# Patient Record
Sex: MALE | Race: WHITE | URBAN - METROPOLITAN AREA
[De-identification: names, ages, dates, MRNs, and addresses within clinical notes are randomized per-mention and may not be internally consistent; named-entity substitution may affect disease eponyms.]

---

## 2017-01-01 ENCOUNTER — HOSPITAL ENCOUNTER (OUTPATIENT)
Facility: CLINIC | Age: 67
End: 2017-01-12
Attending: INTERNAL MEDICINE | Admitting: INTERNAL MEDICINE
Payer: COMMERCIAL

## 2017-01-01 ENCOUNTER — APPOINTMENT (OUTPATIENT)
Dept: CARDIOLOGY | Facility: CLINIC | Age: 67
End: 2017-01-01
Attending: INTERNAL MEDICINE
Payer: COMMERCIAL

## 2017-01-01 PROCEDURE — 33952 ECMO/ECLS INSJ PRPH CANNULA: CPT | Mod: 78 | Performed by: INTERNAL MEDICINE

## 2017-01-01 PROCEDURE — 33952 ECMO/ECLS INSJ PRPH CANNULA: CPT

## 2017-01-01 PROCEDURE — 27210946 ZZH KIT HC TOTES DISP CR8

## 2017-01-01 PROCEDURE — C1769 GUIDE WIRE: HCPCS

## 2017-01-01 PROCEDURE — 27211184 ZZH CARDIOHELP CIRCUIT

## 2017-01-01 PROCEDURE — 27211089 ZZH KIT ACIST INJECTOR CR3

## 2017-01-01 PROCEDURE — 82947 ASSAY GLUCOSE BLOOD QUANT: CPT | Performed by: INTERNAL MEDICINE

## 2017-01-01 PROCEDURE — 84132 ASSAY OF SERUM POTASSIUM: CPT | Performed by: INTERNAL MEDICINE

## 2017-01-01 PROCEDURE — 82330 ASSAY OF CALCIUM: CPT | Performed by: INTERNAL MEDICINE

## 2017-01-01 PROCEDURE — 82803 BLOOD GASES ANY COMBINATION: CPT | Performed by: INTERNAL MEDICINE

## 2017-01-01 PROCEDURE — 84295 ASSAY OF SERUM SODIUM: CPT | Performed by: INTERNAL MEDICINE

## 2017-01-01 PROCEDURE — 27210787 ZZH MANIFOLD CR2

## 2017-01-01 PROCEDURE — 5A15223 EXTRACORPOREAL MEMBRANE OXYGENATION, CONTINUOUS: ICD-10-PCS | Performed by: INTERNAL MEDICINE

## 2017-01-01 PROCEDURE — 33947 ECMO/ECLS INITIATION ARTERY: CPT | Mod: 78 | Performed by: INTERNAL MEDICINE

## 2017-01-01 PROCEDURE — 27210299 ZZH CANNULA, ARTERIAL FEMORAL

## 2017-01-01 PROCEDURE — 33947 ECMO/ECLS INITIATION ARTERY: CPT | Mod: CA

## 2017-01-01 PROCEDURE — 85014 HEMATOCRIT: CPT | Performed by: INTERNAL MEDICINE

## 2017-01-01 PROCEDURE — 83605 ASSAY OF LACTIC ACID: CPT | Performed by: INTERNAL MEDICINE

## 2017-01-01 PROCEDURE — 33952 ECMO/ECLS INSJ PRPH CANNULA: CPT | Mod: CA

## 2017-01-01 PROCEDURE — 25000128 H RX IP 250 OP 636

## 2017-01-12 NOTE — PROCEDURES
PRELIMINARY CARDIAC CATH REPORT:   PROCEDURES PERFORMED:    -- Insertion of Peripheral V-A Extracorporeal Membrane Oxygenation - Cardiohelp    PHYSICIANS:  -- Attending Interventional Cardiology Staff: Dr. Milan Webster MD  -- Interventional Cardiology Fellow: Zach Bob MD     INDICATION:  Norman Baerden is a 65yo gentleman w/ h/o MI, CAD, and ischemic cardiomyopathy s/p ICD.  He arrived with refractory VF cardiac arrest, ongoing NÉSTOR CPR, after collapsing in the presence of paramedics.  EMS was initially called for symptoms concerning for MI.    Witnessed arest (y/n): y  Home or public location (y/n): home  Bystander CPR (y/n): EMS on scene at time of arrest  Time of 911 call: unknown  Initial rhythm: VF  Did they have intermittent ROSC (y/n): n  # of shocks: 7  EtCO2 en route: 7  O2 sat en route: not measured    Initial rhythm in the cath lab: VF  First AB.7/130/15, LA 12.4    DESCRIPTION:  --Emergent Consent.  --Sterile prep and procedure.  Local anesthetic with lidocaine.  A standard (18 g) needle with ultrasound guidance was used to establish vascular access using a modified Seldinger technique.  --Access/Lines:    ECMO Cannulas: 15Fr arterial cannula in RFA, 25Fr venous cannula in RFV  --Fluoroscopy time of 0.4 min.  --Estimated blood loss of 100 mL.  --See below for procedure details.    MEDICATIONS:  --Contrast 0 mL IV.  --Sedation: None.  The patient was unresponsive to stimuli.    HEMODYNAMICS:  Initial rhythm in the cath lab: VF    EXTRACORPOREAL MEMBRANE OXYGENATION CANNULATION:  A 15Fr arterial ECMO cannula was inserted into the right common femoral artery and a 25Fr venous cannula was inserted into the right femoral vein.  The arterial cannula was positioned in the iliac artery and the venous canula was positioned across the RA.  At that time the arterial blood gas returned with the results above.  Given the prolonged arrest with poor oxygenation, further efforts were halted and  time of death was called at 3:40 AM.    COMPLICATIONS:  None    SUMMARY:    --Refractory VF cardiac arrest  --Successful insertion of Peripheral V-A Extracorporeal Membrane Oxygenation - Cardiohelp - in the right femoral artery and vein - though the circuit was not connected and ECMO was aborted due to poor oxygenation during the prolonged resuscitation    The attending interventional cardiologist was present for the entire procedure.    See CVIS report for final draft.    Zach Bob MD  Interventional Cardiology Fellow  [phone]    January 12, 2017

## 2017-01-13 LAB
CA-I BLD-SCNC: 4.4 MG/DL (ref 4.4–5.2)
CO2 BLDCOV-SCNC: ABNORMAL MMOL/L (ref 21–28)
CO2 BLDCOV-SCNC: ABNORMAL MMOL/L (ref 21–28)
GLUCOSE BLD-MCNC: 168 MG/DL (ref 70–99)
HCT VFR BLD CALC: 44 %PCV (ref 40–53)
HGB BLD CALC-MCNC: 15 G/DL (ref 13.3–17.7)
LACTATE BLD-SCNC: 12.4 MMOL/L (ref 0.7–2.1)
PCO2 BLDV: ABNORMAL MM HG (ref 40–50)
PCO2 BLDV: ABNORMAL MM HG (ref 40–50)
PH BLDV: 6.69 PH (ref 7.32–7.43)
PH BLDV: 6.73 PH (ref 7.32–7.43)
PO2 BLDV: 15 MM HG (ref 25–47)
PO2 BLDV: 8 MM HG (ref 25–47)
POTASSIUM BLD-SCNC: 5.1 MMOL/L (ref 3.4–5.3)
SAO2 % BLDV FROM PO2: ABNORMAL %
SAO2 % BLDV FROM PO2: ABNORMAL %
SODIUM BLD-SCNC: 148 MMOL/L (ref 133–144)

## 2017-01-17 NOTE — ADDENDUM NOTE
Encounter addended by: Maryana Milligan RT on: 1/17/2017  6:46 AM<BR>     Documentation filed: Charges VN

## 2017-02-15 LAB — COPATH REPORT: NORMAL
